# Patient Record
Sex: FEMALE | Race: BLACK OR AFRICAN AMERICAN
[De-identification: names, ages, dates, MRNs, and addresses within clinical notes are randomized per-mention and may not be internally consistent; named-entity substitution may affect disease eponyms.]

---

## 2017-05-16 ENCOUNTER — HOSPITAL ENCOUNTER (OUTPATIENT)
Dept: HOSPITAL 61 - ENDOS | Age: 77
Discharge: HOME | End: 2017-05-16
Attending: INTERNAL MEDICINE
Payer: MEDICARE

## 2017-05-16 VITALS — DIASTOLIC BLOOD PRESSURE: 70 MMHG | SYSTOLIC BLOOD PRESSURE: 173 MMHG

## 2017-05-16 DIAGNOSIS — Z87.442: ICD-10-CM

## 2017-05-16 DIAGNOSIS — Z90.49: ICD-10-CM

## 2017-05-16 DIAGNOSIS — K31.9: ICD-10-CM

## 2017-05-16 DIAGNOSIS — E11.9: ICD-10-CM

## 2017-05-16 DIAGNOSIS — Z86.39: ICD-10-CM

## 2017-05-16 DIAGNOSIS — E78.00: ICD-10-CM

## 2017-05-16 DIAGNOSIS — K21.0: Primary | ICD-10-CM

## 2017-05-16 DIAGNOSIS — I10: ICD-10-CM

## 2017-05-16 DIAGNOSIS — K25.4: ICD-10-CM

## 2017-05-16 PROCEDURE — 88342 IMHCHEM/IMCYTCHM 1ST ANTB: CPT

## 2017-05-16 PROCEDURE — 43239 EGD BIOPSY SINGLE/MULTIPLE: CPT

## 2017-05-16 PROCEDURE — 88305 TISSUE EXAM BY PATHOLOGIST: CPT

## 2017-05-16 PROCEDURE — G0641: HCPCS

## 2017-05-16 NOTE — PDOC1
HISTORY & PHYSICAL


H&P


Keily Nazario [id number] 1940 05/02/2017 02:50 PM 1/1 


 [facility], [facility]


 OUR PATIENTS COME FIRST


 [address] [place] [address]  [id number]   


 Ph. [phone]     








Patient:      Keily Nazario


YOB: 1940   


Date:                         05/02/2017 2:50 PM 


Historian:                  daughter


Visit Type:                 Office Visit





This 77 year old female presents for Gastric ulcer's.





History of Present Illness:


1.  Gastric ulcer's 


 Patient is here for follow up for gastric ulcer. Has been having some issue 

with SHAILESH and some unusual sensation in the throat. No true dysphagia. No other 

complain. Has some me upper abdominal discomfort.





INTAKE COMMENTS:


Intake Comments: Nurses Notes: Pt is here today with complaints of discomfort 

in her throat pt states some times she has felt as if there is something stuck 

in her throat and unable to get it out. Pt goes on to say she has felt this for 

over a month, Pt denies any pain at this time.





PROBLEM LIST:











Problem Description Onset Date  


 


Lower leg edema 12/29/2015  


 


Renal diabetes 01/14/2010  


 


Gastric ulcer 06/23/2015  


 


Diabetes type 2, uncontrolled 12/01/2015  


 


Encounter for Medicare annual wellness exam 09/06/2016  


 


Sciatica 10/27/2015  


 


Allergic rhinitis 01/14/2010  


 


Disorder of bone and articular cartilage 01/14/2010  


 


Hyperlipidemia 01/14/2010  


 


Anemia 01/14/2010  


 


Gastroesophageal reflux disease 01/14/2010  


 


Benign essential hypertension 01/14/2010  


 


Hypercalcemia 01/14/2010  


 


Acute gastric ulcer 01/13/2016  








PAST MEDICAL/SURGICAL HISTORY   (Detailed)











Disease/disorder Onset Date Management Date Comments


 


    


 


  Hysterectomy  


 


  Cholecystectomy  


 


  Appendectomy  


 


  Bi-Lat Knee Surgery  


 


Anemia    


 


Colonic polyps 05/07/2013 colonoscopy 05/07/2013 


 


Diabetes    


 


Diverticu    


 


Diverticulum 05/07/2013   


 


GERD    


 


Hypercalcemia    


 


Hyperlipidemia    


 


Hypertension    


 


Internal hemorrhoids 05/07/2013   


 


Osteoarthritis    








DIAGNOSTICS HISTORY:











Test Ordered Interpretation Result completed


 


Colonoscopy 08/11/2009 abnormal Polyps removed, Bx pending


Tubular adenoma and hyperplastic polyp 08/11/2009


 


DM Eye exam 01/15/2009  no retinopathy 01/15/2009


 


Thyroid Uptake Scan 07/09/2009 Normal Normal 07/09/2009


 


Mammogram 11/06/2009 Normal  11/06/2009


 


Ultrasound Thyroid 06/29/2009  Multinodular Goiter 06/29/2009


 


DEXA scan 07/09/2008  Osteopenia 07/09/2008


 


Scan MRI 02/23/2005  Lt knee/medial meniscal tear, degen arthrosis involving 

medial joint compartment 02/23/2005


 


COLONOSCOPY AND BIOPSY 04/29/2013  Imp: Polyps x 2, (bx x 2). Diverticulum in 

the ascending colon. Grade 1 Internal hemorrhoids. 05/07/2013


 


EGD 07/08/2015 abnormal Imp: Possible inflammatory polyp at the site of large 

antral ulcer in the pyloric channel. BX: hyperplastic polyp, showing erosion 

and acute and chronic inflammation. 09/28/2015














Test Ordered Ordering Comments Modifier


 


Colonoscopy 08/11/2009 Pt received verbal and written instructions[place] 


 


DM Eye exam 01/15/2009 Other Reports 


 


Thyroid Uptake Scan 07/09/2009 Diagnostic Images 


 


Mammogram 11/06/2009 Diagnostic Images 


 


Ultrasound Thyroid 06/29/2009 Diagnostic Images 


 


DEXA scan 07/09/2008 Diagnostic Images 


 


Scan MRI 02/23/2005 Diagnostic Images 


 


COLONOSCOPY AND BIOPSY 04/29/2013  


 


EGD 07/08/2015  








Not currently pregnant. 





Medications (Active):











Started Medication Directions Instruction Stopped


 


03/04/2014 ACCU-CHEK FASTCLIX LANCETS CHECK BLOOD SUGAR FOUR TIMES A DAY.  


 


06/11/2013 Accu-Chek Birgit check blood sugars 3 times daily before meals  


 


12/20/2016 Accu-Chek SmartView Test Strips TEST BLOOD SUGAR THREE TIMES A DAY. 

DX:E 11.65 


 


04/11/2017 amlodipine 5 mg tablet take 1 tablet by oral route  every day  


 


12/20/2016 BD Insulin Pen Needle UF Mini 31 gauge x 3/16" use one per injection,

total of 5 per day dx:E11.65 


 


01/30/2017 FUROSEMIDE 40 MG TABLET TAKE 1 TABLET BY MOUTH EVERY DAY  


 


01/31/2017 Lantus Solostar 100 unit/mL (3 mL) subcutaneous insulin pen inject 

Lantus 40unit before breakfast/supper.  


 


12/20/2016 lisinopril 40 mg tablet TAKE 1 TABLET BY MOUTH EVERY DAY  


 


03/13/2017 METOPROLOL SUCC ER 50 MG TAB TAKE 1 TABLET BY MOUTH EVERY DAY *TAKE 

WITH 100MG*  


 


01/31/2017 metoprolol succinate  mg tablet,extended release 24 hr take 1 

tablet by oral route  every day THIS IS CORRECT PT WILL PICK/UP 


 


04/11/2017 Novolog Flexpen 100 unit/mL subcutaneous 20 units sub q before 

breakfst and supper. If BS>200 take 15units at 2pm.  


 


01/31/2017 omeprazole 20 mg capsule,delayed release take 1 capsule by oral 

route  every day before a meal  


 


04/17/2017 POTASSIUM CL ER 20 MEQ TABLET TAKE 1 TABLET BY MOUTH EVERY DAY WITH 

FOOD  


 


12/20/2016 pravastatin 20 mg tablet TAKE 1 TABLET BY MOUTH EVERY DAY  


 


01/31/2017 Zyrtec 10 mg tablet take 1 tablet by ORAL route  every day at 

bedtime  








Allergies:











Ingredient Reaction Medication Name Comment


 


NO KNOWN ALLERGIES   











REVIEW OF SYSTEMS











System Neg/Pos Details


 


Constitutional Negative Chills, fever, malaise and weight loss.


 


ENMT Negative Sore throat.


 


Eyes Negative Double vision.


 


Respiratory Negative Dyspnea and wheezing.


 


Cardio Negative Chest pain and irregular heartbeat/palpitations.


 


GI Positive See HPI.


 


GI Negative See HPI.


 


 Negative Dysuria and hematuria.


 


Endocrine Negative Cold intolerance and heat intolerance.


 


Psych Negative Anxiety.


 


Integumentary Negative Hives and rash.


 


MS Negative Joint pain.


 


Hema/Lymph Negative Easy bleeding and easy bruising.


 


Allergic/Immuno Negative Food allergies.








VITAL SIGNS 











Time BP mm/Hg Pulse /min Resp /min Temp F Ht ft Ht in Ht cm Wt lb Wt kg BMI kg/

m2 BSA m2 O2 Sat%


 


2:34 /68 86 16 98.0 5.0 1.00 154.94 163.60 74.208 30.91  98














Time Measured by


 


2:34 PM Annmarie Tello








PHYSICAL EXAM:











Exam Findings Details


 


Constitutional Normal Well developed.


 


Eyes Normal Conjunctiva - Right: Normal, Left: Normal. Sclera - Right: Normal, 

Left: Normal.


 


Nasopharynx Normal Lips/teeth/gums - Normal.


 


Neck Exam Normal Inspection - Normal. Thyroid gland - Normal.


 


Respiratory Normal Inspection - Normal. Auscultation - Normal.


 


Cardiovascular Normal Regular rate and rhythm. No murmurs, gallops, or rubs.


 


Vascular Normal Pulses - Carotids: Normal, Femoral: Normal, Dorsalis pedis: 

Normal.


 


Abdomen Normal Inspection - Normal. Anterior palpation - No guarding. No 

abdominal tenderness. No hepatic enlargement. No splenic enlargement. No 

hernia. No Ascites.


 


Skin Normal Inspection - Normal.


 


Extremity Normal No edema.


 


Psychiatric Normal Oriented to time, place, person, and situation. Appropriate 

mood and effect.








The patient was checked out at 3:06 PM by Alejandra German.





Assessment/Plan











# Detail Type Description


 


 1. Assessment Chronic gastric ulcer without hemorrhage and without perforation 

(K25.7).


 


 Patient Plan  schedule EGD.


 


 Plan Orders Further diagnostic evaluations ordered today include(s) EGD to be 

performed today.  She is to schedule a follow-up visit with Kvng Reese MD 

upon completion of work-up








Electronically signed by:  Kvng Reese MD  05/02/2017 06:43 PM


Document generated by:  Kvng Reese 05/02/2017 06:43 PM


                      Valentina Murphy MD, Family Practice;  Germán Simpson MD 

Internal Medicine; Esteban Hartman MD, Internal Medicine; 


 Asiya Reese MD Internal Medicine; Kvng Reese MD, Gastroenterology; 


  Jhon Jimenez MD, Rheumatology, S. Gilberto Chaparro, Physical Medicine/Rehab 


 J. Kemi BOLDEN








--------------------------------------------------------------------------------

--------------------------------------------------------------------------------

------








5/16/17





Patient seen and examined. No change in H&P.











KVNG REESE MD May 16, 2017 09:44

## 2017-05-16 NOTE — PDOC4
GI OP Report - Dr. Jackson


Date/Time


DATE: 5/16/17 


TIME: 10:07


Attending Physician


Kvng Jackson MD


Referring Physician





Indications


Follow-up of chronic gastric ulcer with hemorrhage


Pre-Op


See the Anesthesia note for documentation of the administered medications


Procedures


Upper GI endoscopy


Findings


- LA Grade A reflux esophagitis.  


- Nodular mucosa in the gastric antrum.  Biopsied. 


- Normal examined duodenum.


Plan


- Discharge patient to home. 


- Patient has a contact number available for emergencies.  The signs and 

symptoms of potential delayed complications were discussed with the patient.  

Return to normal activities tomorrow.  Written discharge instructions were 

provided to the patient. 


- Resume regular diet. 


- Continue present medications. 


- Await pathology results. 


- Return to my office in 2 weeks.











KVNG JACKSON MD May 16, 2017 10:08

## 2017-05-18 NOTE — PATHOLOGY
PATHOLOGY REPORT 

 

*    *    *    *    *    *    *    * 

 FINAL DIAGNOSIS:

Gastric biopsies, healed gastric ulcer:

- Reactive gastropathy with mild chronic inflammation.

 

COMMENT:

Sections of the gastric biopsy reveal segments of gastric antral

mucosa showing congestion, focal foveolar hyperplasia and mild

chronic inflammation.  An immunoperoxidase stain for Helicobacter is

obtained.  No Helicobacter organisms are identified. There is no

evidence of malignancy. 

(JPM:csd; d/t: 2017)

Immunoperoxidase stains: Helicobacter pylori 

 

 

REPORT ELECTRONICALLY SIGNED BY:   Theodore Marie M.D.

DATE/TIME:   2017 13:27

*    *    *    *    *    *    *    *

 

GROSS PATHOLOGY:

Received in formalin labeled "Girish Almazan, BX of healed gastric

ulcer," are 2 segments of tan soft tissue measuring 0.9 x 0.2 x 0.2

cm in aggregate dimensions and ranging from 0.4 to 0.5 cm in maximum

dimension.  The specimen is submitted entirely in cassette A1.

(ARNULFO; 2017)

 

 

 INITIAL CPT CODE(S):

A; 43136, 35833

Professional services performed by [facility] at 

[place] [address]

 

  Technical services performed by [facility] at [address] [place] [address].

  

 SPECIMEN(S) RECEIVED:

A.Biopsy of healed gastric ulcer

 

 CLINICAL HISTORY:

History of gastric ulcer

 

 PATIENT:  GIRISH ALMAZAN

/AGE:  1940 (Age: 77)  

PATIENT #:  [id number]

ACCESSION #:  [id number]          ALT CASE #:   

SPECIMEN COLLECTION DATE:  2017

SPECIMEN RECEIVED DATE:  2017

   

[facility] - [address] [place] [address] - PHONE: 

[phone]

* * *  END OF REPORT  * * *

## 2018-03-13 VITALS — SYSTOLIC BLOOD PRESSURE: 159 MMHG | DIASTOLIC BLOOD PRESSURE: 49 MMHG

## 2018-04-17 ENCOUNTER — HOSPITAL ENCOUNTER (OUTPATIENT)
Dept: HOSPITAL 61 - RAD | Age: 78
Discharge: HOME | End: 2018-04-17
Attending: PHYSICAL MEDICINE & REHABILITATION
Payer: MEDICARE

## 2018-04-17 DIAGNOSIS — M85.80: ICD-10-CM

## 2018-04-17 DIAGNOSIS — M17.0: Primary | ICD-10-CM

## 2018-04-17 PROCEDURE — 73140 X-RAY EXAM OF FINGER(S): CPT

## 2018-04-17 PROCEDURE — 73565 X-RAY EXAM OF KNEES: CPT

## 2021-01-06 ENCOUNTER — HOSPITAL ENCOUNTER (OUTPATIENT)
Dept: HOSPITAL 61 - KCIC MRI | Age: 81
End: 2021-01-06
Attending: NURSE PRACTITIONER
Payer: MEDICARE

## 2021-01-06 DIAGNOSIS — J34.89: ICD-10-CM

## 2021-01-06 DIAGNOSIS — G31.89: ICD-10-CM

## 2021-01-06 DIAGNOSIS — G93.89: Primary | ICD-10-CM

## 2021-01-06 DIAGNOSIS — Z86.69: ICD-10-CM

## 2021-01-06 PROCEDURE — 70551 MRI BRAIN STEM W/O DYE: CPT

## 2021-01-06 NOTE — KCIC
MRI of the brain without contrast 1/6/2021



Clinical History: Tremors.



Technique: Unenhanced T1-weighted sagittal and axial, T2-weighted axial and coronal and FLAIR, gradie
nt echo and diffusion-weighted axial images of the brain were obtained.



Findings: Comparison study is dated 9/4/2014.



There is generalized parenchymal atrophy. Patchy, confluent and multiple small focal areas of increas
ed signal intensity are seen within the periventricular and subcortical white matter of both cerebral
 hemispheres along with the nathan on the FLAIR and T2-weighted images consistent with areas of small v
essel ischemic disease. These have not significantly changed. An area of encephalomalacia is seen inv
olving the anterior inferior left temporal lobe.



No acute parenchymal abnormality is seen. No extra-axial fluid collection is seen. There is no MRI ev
idence of acute ischemia/infarction.



Mild mucosal thickening is seen scattered throughout the ethmoid air cells bilaterally. Normal flow v
oids are seen within the major vascular structures surrounding the brain parenchyma.



Impression: No acute parenchymal abnormality is seen.



Electronically signed by: Prateek Cooley MD (1/6/2021 5:21 PM) [id number]

## 2021-07-07 ENCOUNTER — HOSPITAL ENCOUNTER (EMERGENCY)
Dept: HOSPITAL 61 - ER | Age: 81
Discharge: HOME | End: 2021-07-07
Payer: MEDICARE

## 2021-07-07 VITALS
DIASTOLIC BLOOD PRESSURE: 84 MMHG | DIASTOLIC BLOOD PRESSURE: 84 MMHG | SYSTOLIC BLOOD PRESSURE: 197 MMHG | SYSTOLIC BLOOD PRESSURE: 197 MMHG

## 2021-07-07 VITALS — HEIGHT: 63 IN | BODY MASS INDEX: 25.43 KG/M2 | WEIGHT: 143.52 LBS

## 2021-07-07 DIAGNOSIS — E11.9: ICD-10-CM

## 2021-07-07 DIAGNOSIS — M79.652: ICD-10-CM

## 2021-07-07 DIAGNOSIS — I10: ICD-10-CM

## 2021-07-07 DIAGNOSIS — M25.552: ICD-10-CM

## 2021-07-07 DIAGNOSIS — M54.16: Primary | ICD-10-CM

## 2021-07-07 DIAGNOSIS — E78.00: ICD-10-CM

## 2021-07-07 PROCEDURE — 72100 X-RAY EXAM L-S SPINE 2/3 VWS: CPT

## 2021-07-07 PROCEDURE — 99283 EMERGENCY DEPT VISIT LOW MDM: CPT

## 2021-07-07 NOTE — RAD
Lumbar spine 3 views.



HISTORY: Right-sided pain



3 views were taken of the lumbar spine. Lumbar spine is in normal alignment. Disc spaces are normal i
n height. There are hypertrophic changes on the vertebral bodies. There is no acute fracture. Bowel p
attern of the abdomen is unremarkable.



IMPRESSION:

1. Hypertrophic changes in the lumbar spine.

2. No acute fracture.



Electronically signed by: Rhett Washington MD (7/7/2021 8:51 AM) [id number]

## 2021-07-07 NOTE — ED.ADGEN
Past Medical History


Past Medical History:  Diabetes-Type II, High Cholesterol, Hypertension


Past Surgical History:  Knee Replacement, Other


Additional Past Surgical Histo:  bilat knee repl


Smoking Status:  Never Smoker


Alcohol Use:  None


Drug Use:  None





General Adult


HPI:


HPI:





Patient is a 81-year-old female who arrives ambulatory to the emergency 

department complaining of left-sided low back pain which is been ongoing now for

4 days.  Patient describes pain of her low back which has radiated down into her

left hip, left thigh and leg.  Patient has been evaluated by her primary care 

physician who is requested imaging with respect to this phenomenon.  Patient 

states despite having this pain, she has no history of injury or fever.  

Additionally she has no abdominal pain or changes to her bowel/bladder habits.  

She further denies any saddle anesthesia





Review of Systems:


Review of Systems:


Constitutional:   Denies fever or chills. []


Eyes:   Denies change in visual acuity. []


HENT:   Denies nasal congestion or sore throat. [] 


Respiratory:   Denies cough or shortness of breath. [] 


Cardiovascular:   Denies chest pain or edema. [] 


GI:   Denies abdominal pain, nausea, vomiting, bloody stools or diarrhea. [] 


:  Denies dysuria. [] 


Musculoskeletal:   Reports low back pain and extremity pain.  [] 


Integument:   Denies rash. [] 


Neurologic:   Denies headache, focal weakness or sensory changes. [] 


Endocrine:   Denies polyuria or polydipsia. [] 


Lymphatic:  Denies swollen glands. [] 


Psychiatric:  Denies depression or anxiety. []





Allergies:


Allergies:





Allergies








Coded Allergies Type Severity Reaction Last Updated Verified


 


  No Known Drug Allergies    17 No











Physical Exam:


PE:





Constitutional: Well developed, well nourished, no acute distress, non-toxic 

appearance. []


HENT: Normocephalic, atraumatic, bilateral external ears normal, oropharynx 

moist, no oral exudates, nose normal. []


Eyes: PERRLA, EOMI, conjunctiva normal, no discharge. [] 


Neck: Normal range of motion, no tenderness, supple, no stridor. [] 


Cardiovascular:Heart rate regular rhythm, no murmur []


Lungs & Thorax:  Bilateral breath sounds clear to auscultation []


Abdomen: Bowel sounds normal, soft, no tenderness, no masses, no pulsatile 

masses. [] 


Skin: Warm, dry, no erythema, no rash. [] 


Back: Minimal tenderness in the paraspinal lumbar musculature.  No CVA 

tenderness. [] 


Extremities: No tenderness, no cyanosis, no clubbing, ROM intact, no edema. [] 


Neurologic: Alert and oriented X 3, normal motor function, normal sensory 

function, no focal deficits noted. []


Psychologic: Affect normal, judgement normal, mood normal. []





Current Patient Data:


Vital Signs:





                                   Vital Signs








  Date Time  Temp Pulse Resp B/P (MAP) Pulse Ox O2 Delivery O2 Flow Rate FiO2


 


21 08:08 98.1 96 20 197/84 (85) 99 Room Air  





 98.1       











EKG:


EKG:


[]





Heart Score:


C/O Chest Pain:  N/A


Risk Factors:


Risk Factors:  DM, Current or recent (<one month) smoker, HTN, HLP, family 

history of CAD, obesity.


Risk Scores:


Score 0 - 3:  2.5% MACE over next 6 weeks - Discharge Home


Score 4 - 6:  20.3% MACE over next 6 weeks - Admit for Clinical Observation


Score 7 - 10:  72.7% MACE over next 6 weeks - Early Invasive Strategies





Radiology/Procedures:


Radiology/Procedures:


[]


Impression:


[facility]


                    [address] [facility] [place] [address]


                                 [phone]


                                        


                                 IMAGING REPORT





                                     Signed





PATIENT: GIRISH ALMAZAN ACCOUNT: [id number]     MRN#: [id number]


: 1940           LOCATION: ER              AGE: 81


SEX: F                    EXAM DT: 21         ACCESSION#: [id number]


STATUS: REG ER            ORD. PHYSICIAN: LUCIE ROOT DO


REASON: pain, right side ,no injury


PROCEDURE: LUMBAR SPINE 2-3V





Lumbar spine 3 views.





HISTORY: Right-sided pain





3 views were taken of the lumbar spine. Lumbar spine is in normal alignment. 

Disc spaces are normal in height. There are hypertrophic changes on the 

vertebral bodies. There is no acute fracture. Bowel pattern of the abdomen is 

unremarkable.





IMPRESSION:


1. Hypertrophic changes in the lumbar spine.


2. No acute fracture.





Electronically signed by: Rhett Washington MD (2021 8:51 AM) [id number]














DICTATED and SIGNED BY:     RHETT WASHINGTON MD


DATE:     21 [id number] 0








Course & Med Decision Making:


Course & Med Decision Making


Pertinent Labs and Imaging studies reviewed. (See chart for details)








[] The patient does have evidence of facet hypertrophy on plain film.  Given 

this and her symptoms, I advised that she take a short steroid regimen which I 

prescribed.  Should she not experience any improvement from this have advised 

that she consider follow-up with her primary care physician for referral to pain

 management.  Should the patient develop any saddle anesthesia or change in her 

bladder/bowel habits, I advised she return to the emergency department im

mediately.  The patient understands and has agreed to do so.  She is nontoxic-

appearing neurological intact.  She stable for discharge.





Dragon Disclaimer:


Dragon Disclaimer:


This electronic medical record was generated, in whole or in part, using a voice

 recognition dictation system.





Departure


Departure


Impression:  


   Primary Impression:  


   Lumbar back pain with radiculopathy affecting left lower extremity


Disposition:  01 HOME / SELF CARE / HOMELESS


Condition:  STABLE


Referrals:  


LENA JACKSON MD (PCP)


Patient Instructions:  Sciatica


Scripts


Cyclobenzaprine Hcl (CYCLOBENZAPRINE HCL) 5 Mg Tablet


1 TAB PO BID for 7 Days, #14 TAB


   Prov: LUCIE ROOT DO         21 


Methylprednisolone (MEDROL) 4 Mg Tab.ds.pk


1 PKG PO UD for inflammation, #1 PKG


   Prov: LUCIE ROOT DO         21











LUCIE ROOT DO                2021 08:15

## 2021-07-09 ENCOUNTER — HOSPITAL ENCOUNTER (OUTPATIENT)
Dept: HOSPITAL 61 - US | Age: 81
End: 2021-07-09
Attending: INTERNAL MEDICINE
Payer: MEDICARE

## 2021-07-09 DIAGNOSIS — M54.5: ICD-10-CM

## 2021-07-09 DIAGNOSIS — N28.1: Primary | ICD-10-CM

## 2021-07-09 PROCEDURE — 76881 US COMPL JOINT R-T W/IMG: CPT

## 2021-07-09 PROCEDURE — 76770 US EXAM ABDO BACK WALL COMP: CPT

## 2021-07-09 NOTE — RAD
EXAMINATION: US RENAL BILAT, US EXT NON VASC LEFT



INDICATION: 81 years, Female, right lumbar paraspinal pain. Left flank pain.



COMPARISON: None



TECHNIQUE: Grayscale and limited color Doppler evaluation of the kidneys and bladder was performed. L
imited real-time sonographic evaluation of the area of clinical concern in the left lower back soft t
issue, supplemented by color Doppler.



FINDINGS:



ULTRASOUND RENAL:

RIGHT KIDNEY:

MEASURES: 9.6 x 3.9 x 5.0 cm.

MORPHOLOGY/PARENCHYMA: Normal corticomedullary differentiation with no shadowing calculus. There is a
 2.7 x 2.5 cm simple appearing cyst in the lateral cortex of the upper pole.   

COLLECTING SYSTEM: No hydronephrosis.   



LEFT KIDNEY:

MEASURES: 10.8 x 3.8 x 5.2 cm.

MORPHOLOGY/PARENCHYMA: Normal corticomedullary differentiation with no shadowing calculus or discrete
 masses.   

COLLECTING SYSTEM: No hydronephrosis.



URINARY BLADDER: Unremarkable.



ULTRASOUND NONVASCULAR LEFT

Interrogation area of clinical concern in the left lower back (area of pain) demonstrates no focal ma
sses, fluid collection or hyperemia



IMPRESSION:

1. No hydronephrosis or shadowing calculi in either kidney.

2. Simple 2.7 cm right renal cyst.

3. Interrogation area of clinical concern in the left lower back (area of pain), demonstrates no foca
l masses, fluid collection or hyperemia.



Electronically signed by: Hector Parr MD (7/9/2021 5:44 PM) [id number]

## 2021-07-09 NOTE — RAD
EXAMINATION: US RENAL BILAT, US EXT NON VASC LEFT



INDICATION: 81 years, Female, right lumbar paraspinal pain. Left flank pain.



COMPARISON: None



TECHNIQUE: Grayscale and limited color Doppler evaluation of the kidneys and bladder was performed. L
imited real-time sonographic evaluation of the area of clinical concern in the left lower back soft t
issue, supplemented by color Doppler.



FINDINGS:



ULTRASOUND RENAL:

RIGHT KIDNEY:

MEASURES: 9.6 x 3.9 x 5.0 cm.

MORPHOLOGY/PARENCHYMA: Normal corticomedullary differentiation with no shadowing calculus. There is a
 2.7 x 2.5 cm simple appearing cyst in the lateral cortex of the upper pole.   

COLLECTING SYSTEM: No hydronephrosis.   



LEFT KIDNEY:

MEASURES: 10.8 x 3.8 x 5.2 cm.

MORPHOLOGY/PARENCHYMA: Normal corticomedullary differentiation with no shadowing calculus or discrete
 masses.   

COLLECTING SYSTEM: No hydronephrosis.



URINARY BLADDER: Unremarkable.



ULTRASOUND NONVASCULAR LEFT

Interrogation area of clinical concern in the left lower back (area of pain) demonstrates no focal ma
sses, fluid collection or hyperemia



IMPRESSION:

1. No hydronephrosis or shadowing calculi in either kidney.

2. Simple 2.7 cm right renal cyst.

3. Interrogation area of clinical concern in the left lower back (area of pain), demonstrates no foca
l masses, fluid collection or hyperemia.



Electronically signed by: Hector Parr MD (7/9/2021 5:44 PM) [id number]